# Patient Record
Sex: MALE | Race: WHITE | URBAN - METROPOLITAN AREA
[De-identification: names, ages, dates, MRNs, and addresses within clinical notes are randomized per-mention and may not be internally consistent; named-entity substitution may affect disease eponyms.]

---

## 2021-11-12 ENCOUNTER — OFFICE VISIT (OUTPATIENT)
Dept: URBAN - METROPOLITAN AREA CLINIC 54 | Facility: CLINIC | Age: 36
End: 2021-11-12
Payer: SELF-PAY

## 2021-11-12 ENCOUNTER — DASHBOARD ENCOUNTERS (OUTPATIENT)
Age: 36
End: 2021-11-12

## 2021-11-12 ENCOUNTER — WEB ENCOUNTER (OUTPATIENT)
Dept: URBAN - METROPOLITAN AREA CLINIC 54 | Facility: CLINIC | Age: 36
End: 2021-11-12

## 2021-11-12 DIAGNOSIS — R14.2 BELCHING: ICD-10-CM

## 2021-11-12 DIAGNOSIS — K44.9 HIATAL HERNIA: ICD-10-CM

## 2021-11-12 DIAGNOSIS — K21.9 GASTROESOPHAGEAL REFLUX DISEASE, UNSPECIFIED WHETHER ESOPHAGITIS PRESENT: ICD-10-CM

## 2021-11-12 DIAGNOSIS — E88.81 METABOLIC SYNDROME: ICD-10-CM

## 2021-11-12 DIAGNOSIS — R10.13 EPIGASTRIC PAIN: ICD-10-CM

## 2021-11-12 DIAGNOSIS — E66.9 OBESITY (BMI 30.0-34.9): ICD-10-CM

## 2021-11-12 DIAGNOSIS — F10.10 ALCOHOL ABUSE: ICD-10-CM

## 2021-11-12 DIAGNOSIS — F12.10 MARIJUANA ABUSE: ICD-10-CM

## 2021-11-12 DIAGNOSIS — R94.5 ABNORMAL LIVER FUNCTION TESTS: ICD-10-CM

## 2021-11-12 DIAGNOSIS — K76.0 FATTY LIVER: ICD-10-CM

## 2021-11-12 DIAGNOSIS — R11.2 NON-INTRACTABLE VOMITING WITH NAUSEA, UNSPECIFIED VOMITING TYPE: ICD-10-CM

## 2021-11-12 PROBLEM — 197321007: Status: ACTIVE | Noted: 2021-11-12

## 2021-11-12 PROBLEM — 15167005: Status: ACTIVE | Noted: 2021-11-12

## 2021-11-12 PROBLEM — 235595009: Status: ACTIVE | Noted: 2021-11-12

## 2021-11-12 PROBLEM — 37344009: Status: ACTIVE | Noted: 2021-11-12

## 2021-11-12 PROBLEM — 237602007: Status: ACTIVE | Noted: 2021-11-12

## 2021-11-12 PROBLEM — 443371000124107: Status: ACTIVE | Noted: 2021-11-12

## 2021-11-12 PROCEDURE — 99244 OFF/OP CNSLTJ NEW/EST MOD 40: CPT | Performed by: INTERNAL MEDICINE

## 2021-11-12 RX ORDER — AMLODIPINE BESYLATE AND BENAZEPRIL HYDROCHLORIDE 10; 40 MG/1; MG/1
1 CAP CAPSULE ORAL QD
Status: DISCONTINUED | COMMUNITY

## 2021-11-12 RX ORDER — BISOPROLOL FUMARATE AND HYDROCHLOROTHIAZIDE 10; 6.25 MG/1; MG/1
1 TABLET TABLET, FILM COATED ORAL ONCE A DAY
Status: ACTIVE | COMMUNITY

## 2021-11-12 RX ORDER — FLUOXETINE 40 MG/1
1 CAPSULE CAPSULE ORAL ONCE A DAY
Status: ACTIVE | COMMUNITY

## 2021-11-12 RX ORDER — METFORMIN HYDROCHLORIDE 500 MG/1
1 TABLET WITH A MEAL TABLET, FILM COATED ORAL ONCE A DAY
Status: ACTIVE | COMMUNITY

## 2021-11-12 RX ORDER — ESOMEPRAZOLE MAGNESIUM 20 MG/1
1 CAPSULE CAPSULE, DELAYED RELEASE ORAL ONCE A DAY
Status: ACTIVE | COMMUNITY

## 2021-11-12 RX ORDER — GLIMEPIRIDE 2 MG/1
1 TABLET WITH BREAKFAST OR THE FIRST MAIN MEAL OF THE DAY TABLET ORAL ONCE A DAY
Status: ACTIVE | COMMUNITY

## 2021-11-12 RX ORDER — AMLODIPINE BESYLATE AND BENAZEPRIL HYDROCHLORIDE 10; 40 MG/1; MG/1
AS DIRECTED CAPSULE ORAL
Status: ACTIVE | COMMUNITY

## 2021-11-12 NOTE — HPI-TODAY'S VISIT:
Patient is a 37 yo man referred by Dr. Yessi Hamilton for above reasons. A copy of this document will be sent to referring provider. He c/o epigastric pain, eructations,  nausea and vomiting. He carries a diagnosis of GERD and HH x 10 years. He has been on Nexium 40 QD x 10 years which lost effectiveness 2 months ago. He tried Prilosec 20 QD with no relief. He cannot identifiy any obvious triggers. He also has mild dysphagia and odynophagia. He was diagnosed with uncomplicated COVID 5 weeks ago, however symptoms started before his diagnosis. He denies any new medications. He denies tobacco but admits to using MJ couple days a week. He abuses alcohol, 12 drinks per day. His last EGD was > 5 years ago with unclear results. He has never had ambulatory pH monitoring, HREM or UGI barium studies.

## 2021-11-16 LAB
ALPHA 2-MACROGLOBULINS, QN: 151
ALT (SGPT) P5P: 307
APOLIPOPROTEIN A-1: 115
AST (SGOT) P5P: 320
BASO (ABSOLUTE): 0.1
BASOS: 2
BILIRUBIN, TOTAL: 0.5
CHOLESTEROL, TOTAL: 250
COMMENT:: (no result)
EOS (ABSOLUTE): 0.2
EOS: 4
FERRITIN, SERUM: 48
FIBROSIS SCORE: 0.26
FIBROSIS SCORING:: (no result)
FIBROSIS STAGE: (no result)
GGT: 417
GLUCOSE, SERUM: 198
HAPTOGLOBIN: 267
HBSAG SCREEN: NEGATIVE
HEIGHT:: 71
HEMATOCRIT: 37.2
HEMATOLOGY COMMENTS:: (no result)
HEMOGLOBIN: 11.4
HEP A AB, TOTAL: NEGATIVE
HEP C VIRUS AB: <0.1
HEPATITIS B SURF AB QUANT: 148.3
IMMATURE CELLS: (no result)
IMMATURE GRANS (ABS): 0
IMMATURE GRANULOCYTES: 0
INR: 1
INTERPRETATIONS:: (no result)
LIMITATIONS:: (no result)
LYMPHS (ABSOLUTE): 1
LYMPHS: 18
MCH: 22.6
MCHC: 30.6
MCV: 74
MONOCYTES(ABSOLUTE): 0.6
MONOCYTES: 11
NASH GRADE: (no result)
NASH SCORE: 0.5
NASH SCORING: (no result)
NEUTROPHILS (ABSOLUTE): 3.6
NEUTROPHILS: 65
NRBC: (no result)
PLATELETS: 251
PROTHROMBIN TIME: 10.4
RBC: 5.05
RDW: 16.4
STEATOSIS GRADE: (no result)
STEATOSIS GRADING: (no result)
STEATOSIS SCORE: 0.97
TRIGLYCERIDES: 193
WBC: 5.4
WEIGHT:: 241

## 2021-11-17 ENCOUNTER — OFFICE VISIT (OUTPATIENT)
Dept: URBAN - METROPOLITAN AREA CLINIC 53 | Facility: CLINIC | Age: 36
End: 2021-11-17
Payer: SELF-PAY

## 2021-11-17 DIAGNOSIS — R74.8 ABNORMAL LIVER ENZYMES: ICD-10-CM

## 2021-11-17 PROCEDURE — 76705 ECHO EXAM OF ABDOMEN: CPT | Performed by: INTERNAL MEDICINE

## 2021-11-17 RX ORDER — METFORMIN HYDROCHLORIDE 500 MG/1
1 TABLET WITH A MEAL TABLET, FILM COATED ORAL ONCE A DAY
Status: ACTIVE | COMMUNITY

## 2021-11-17 RX ORDER — FLUOXETINE 40 MG/1
1 CAPSULE CAPSULE ORAL ONCE A DAY
Status: ACTIVE | COMMUNITY

## 2021-11-17 RX ORDER — BISOPROLOL FUMARATE AND HYDROCHLOROTHIAZIDE 10; 6.25 MG/1; MG/1
1 TABLET TABLET, FILM COATED ORAL ONCE A DAY
Status: ACTIVE | COMMUNITY

## 2021-11-17 RX ORDER — ESOMEPRAZOLE MAGNESIUM 20 MG/1
1 CAPSULE CAPSULE, DELAYED RELEASE ORAL ONCE A DAY
Status: ACTIVE | COMMUNITY

## 2021-11-17 RX ORDER — AMLODIPINE BESYLATE AND BENAZEPRIL HYDROCHLORIDE 10; 40 MG/1; MG/1
AS DIRECTED CAPSULE ORAL
Status: ACTIVE | COMMUNITY

## 2021-11-17 RX ORDER — GLIMEPIRIDE 2 MG/1
1 TABLET WITH BREAKFAST OR THE FIRST MAIN MEAL OF THE DAY TABLET ORAL ONCE A DAY
Status: ACTIVE | COMMUNITY

## 2021-11-19 ENCOUNTER — LAB OUTSIDE AN ENCOUNTER (OUTPATIENT)
Dept: URBAN - METROPOLITAN AREA CLINIC 54 | Facility: CLINIC | Age: 36
End: 2021-11-19

## 2021-11-30 ENCOUNTER — CLAIMS CREATED FROM THE CLAIM WINDOW (OUTPATIENT)
Dept: URBAN - METROPOLITAN AREA CLINIC 4 | Facility: CLINIC | Age: 36
End: 2021-11-30
Payer: SELF-PAY

## 2021-11-30 ENCOUNTER — OFFICE VISIT (OUTPATIENT)
Dept: URBAN - METROPOLITAN AREA SURGERY CENTER 14 | Facility: SURGERY CENTER | Age: 36
End: 2021-11-30
Payer: SELF-PAY

## 2021-11-30 DIAGNOSIS — K21.9 ACID REFLUX: ICD-10-CM

## 2021-11-30 DIAGNOSIS — R10.13 ABDOMINAL DISCOMFORT, EPIGASTRIC: ICD-10-CM

## 2021-11-30 DIAGNOSIS — K31.89 ACQUIRED DEFORMITY OF DUODENUM: ICD-10-CM

## 2021-11-30 DIAGNOSIS — K31.89 DEFORMED PYLORUS, ACQUIRED: ICD-10-CM

## 2021-11-30 DIAGNOSIS — K21.9 GASTRO-ESOPHAGEAL REFLUX DISEASE WITHOUT ESOPHAGITIS: ICD-10-CM

## 2021-11-30 PROCEDURE — 88342 IMHCHEM/IMCYTCHM 1ST ANTB: CPT | Performed by: PATHOLOGY

## 2021-11-30 PROCEDURE — 43239 EGD BIOPSY SINGLE/MULTIPLE: CPT | Performed by: INTERNAL MEDICINE

## 2021-11-30 PROCEDURE — 88312 SPECIAL STAINS GROUP 1: CPT | Performed by: PATHOLOGY

## 2021-11-30 PROCEDURE — G8907 PT DOC NO EVENTS ON DISCHARG: HCPCS | Performed by: INTERNAL MEDICINE

## 2021-11-30 PROCEDURE — 88305 TISSUE EXAM BY PATHOLOGIST: CPT | Performed by: PATHOLOGY
